# Patient Record
Sex: MALE | Race: OTHER
[De-identification: names, ages, dates, MRNs, and addresses within clinical notes are randomized per-mention and may not be internally consistent; named-entity substitution may affect disease eponyms.]

---

## 2017-03-29 ENCOUNTER — HOSPITAL ENCOUNTER (OUTPATIENT)
Dept: HOSPITAL 53 - M PAIN | Age: 57
End: 2017-03-29
Attending: NURSE PRACTITIONER
Payer: OTHER GOVERNMENT

## 2017-03-29 DIAGNOSIS — E78.00: ICD-10-CM

## 2017-03-29 DIAGNOSIS — M54.81: ICD-10-CM

## 2017-03-29 DIAGNOSIS — F41.9: ICD-10-CM

## 2017-03-29 DIAGNOSIS — G47.30: ICD-10-CM

## 2017-03-29 DIAGNOSIS — G89.29: Primary | ICD-10-CM

## 2017-03-29 DIAGNOSIS — R51: ICD-10-CM

## 2017-03-29 DIAGNOSIS — Z87.820: ICD-10-CM

## 2017-03-29 DIAGNOSIS — K21.9: ICD-10-CM

## 2017-03-29 DIAGNOSIS — F43.10: ICD-10-CM

## 2017-03-29 DIAGNOSIS — Z79.1: ICD-10-CM

## 2017-03-29 DIAGNOSIS — I10: ICD-10-CM

## 2017-03-29 DIAGNOSIS — Z79.899: ICD-10-CM

## 2017-03-29 DIAGNOSIS — Z88.5: ICD-10-CM
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
## 2017-04-26 ENCOUNTER — HOSPITAL ENCOUNTER (OUTPATIENT)
Dept: HOSPITAL 53 - M PAIN | Age: 57
End: 2017-04-26
Attending: ANESTHESIOLOGY
Payer: OTHER GOVERNMENT

## 2017-04-26 DIAGNOSIS — G47.30: ICD-10-CM

## 2017-04-26 DIAGNOSIS — K21.9: ICD-10-CM

## 2017-04-26 DIAGNOSIS — E78.00: ICD-10-CM

## 2017-04-26 DIAGNOSIS — F41.9: ICD-10-CM

## 2017-04-26 DIAGNOSIS — I10: ICD-10-CM

## 2017-04-26 DIAGNOSIS — G43.909: ICD-10-CM

## 2017-04-26 DIAGNOSIS — G89.29: Primary | ICD-10-CM

## 2017-04-26 DIAGNOSIS — Z79.899: ICD-10-CM

## 2017-04-26 DIAGNOSIS — Z88.5: ICD-10-CM

## 2017-04-26 DIAGNOSIS — Z87.820: ICD-10-CM

## 2017-04-26 DIAGNOSIS — M54.81: ICD-10-CM

## 2017-04-26 DIAGNOSIS — F43.10: ICD-10-CM

## 2017-04-26 PROCEDURE — 64405 NJX AA&/STRD GR OCPL NRV: CPT
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
## 2017-05-17 ENCOUNTER — HOSPITAL ENCOUNTER (OUTPATIENT)
Dept: HOSPITAL 53 - M PAIN | Age: 57
End: 2017-05-17
Attending: NURSE PRACTITIONER
Payer: OTHER GOVERNMENT

## 2017-05-17 DIAGNOSIS — F43.10: ICD-10-CM

## 2017-05-17 DIAGNOSIS — I10: ICD-10-CM

## 2017-05-17 DIAGNOSIS — F41.9: ICD-10-CM

## 2017-05-17 DIAGNOSIS — Z86.19: ICD-10-CM

## 2017-05-17 DIAGNOSIS — M54.81: ICD-10-CM

## 2017-05-17 DIAGNOSIS — G47.30: ICD-10-CM

## 2017-05-17 DIAGNOSIS — Z79.899: ICD-10-CM

## 2017-05-17 DIAGNOSIS — E78.00: ICD-10-CM

## 2017-05-17 DIAGNOSIS — G89.29: Primary | ICD-10-CM

## 2017-05-17 DIAGNOSIS — G43.909: ICD-10-CM

## 2017-05-17 DIAGNOSIS — Z88.5: ICD-10-CM

## 2017-05-17 DIAGNOSIS — K21.9: ICD-10-CM

## 2017-05-30 ENCOUNTER — HOSPITAL ENCOUNTER (OUTPATIENT)
Dept: HOSPITAL 53 - M PAIN | Age: 57
End: 2017-05-30
Attending: NURSE PRACTITIONER
Payer: OTHER GOVERNMENT

## 2017-05-30 DIAGNOSIS — Z88.5: ICD-10-CM

## 2017-05-30 DIAGNOSIS — M54.81: Primary | ICD-10-CM

## 2017-05-30 DIAGNOSIS — Z79.899: ICD-10-CM
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
## 2017-07-31 ENCOUNTER — HOSPITAL ENCOUNTER (OUTPATIENT)
Dept: HOSPITAL 53 - M PAIN | Age: 57
End: 2017-07-31
Attending: NURSE PRACTITIONER
Payer: OTHER GOVERNMENT

## 2017-07-31 DIAGNOSIS — Z88.5: ICD-10-CM

## 2017-07-31 DIAGNOSIS — M54.81: Primary | ICD-10-CM

## 2017-07-31 DIAGNOSIS — G89.29: ICD-10-CM

## 2017-07-31 DIAGNOSIS — Z79.899: ICD-10-CM
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
## 2018-12-11 ENCOUNTER — HOSPITAL ENCOUNTER (OUTPATIENT)
Dept: HOSPITAL 53 - M SMT | Age: 58
End: 2018-12-11
Attending: INTERNAL MEDICINE

## 2018-12-11 DIAGNOSIS — Z00.00: Primary | ICD-10-CM

## 2023-01-30 ENCOUNTER — HOSPITAL ENCOUNTER (OUTPATIENT)
Dept: HOSPITAL 53 - M SLEEP | Age: 63
End: 2023-01-30
Attending: PHYSICIAN ASSISTANT
Payer: OTHER GOVERNMENT

## 2023-01-30 DIAGNOSIS — G47.61: Primary | ICD-10-CM

## 2023-01-30 DIAGNOSIS — G47.33: ICD-10-CM
